# Patient Record
(demographics unavailable — no encounter records)

---

## 2025-01-09 NOTE — ASSESSMENT
[FreeTextEntry1] :  in summary, the patient is a 63-year-old man with a remote history of coronary artery bypass grafting his current ejection fraction by echocardiography is 40 to 45%.  For now have scheduled him for exercise stress testing with nuclear imaging to assess for ischemia  Have renewed his medications including 20 mg of rosuvastatin.  Given the fact that he is unsure of what dose he is taking right now we will await several months of 20 mg prior to rechecking lipid levels

## 2025-01-09 NOTE — REASON FOR VISIT
[Hyperlipidemia] : hyperlipidemia [Coronary Artery Disease] : coronary artery disease [FreeTextEntry1] : Patient returns for followup. Feeling well. Offers no complaints of chest discomfort shortness of breath palpitations dizziness or syncope.  Patient is unaware of whether or not he is taking the increased dose of rosuvastatin.  He is requesting that I renew his medications including allopurinol